# Patient Record
Sex: MALE | Race: WHITE
[De-identification: names, ages, dates, MRNs, and addresses within clinical notes are randomized per-mention and may not be internally consistent; named-entity substitution may affect disease eponyms.]

---

## 2017-08-01 ENCOUNTER — HOSPITAL ENCOUNTER (EMERGENCY)
Dept: HOSPITAL 33 - ED | Age: 22
Discharge: HOME | End: 2017-08-01
Payer: COMMERCIAL

## 2017-08-01 VITALS — OXYGEN SATURATION: 100 % | SYSTOLIC BLOOD PRESSURE: 124 MMHG | DIASTOLIC BLOOD PRESSURE: 70 MMHG | HEART RATE: 73 BPM

## 2017-08-01 DIAGNOSIS — R50.9: ICD-10-CM

## 2017-08-01 DIAGNOSIS — J02.9: ICD-10-CM

## 2017-08-01 DIAGNOSIS — H10.9: Primary | ICD-10-CM

## 2017-08-01 PROCEDURE — 70450 CT HEAD/BRAIN W/O DYE: CPT

## 2017-08-01 PROCEDURE — 99284 EMERGENCY DEPT VISIT MOD MDM: CPT

## 2017-08-01 PROCEDURE — 99283 EMERGENCY DEPT VISIT LOW MDM: CPT
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
## 2017-08-02 NOTE — XRAY
Indication: Pain and dizziness following head injury 2 days ago.



Multiple contiguous axial images obtained through the head without contrast.



Comparison: June 23, 2011.



Again normal appearing brain parenchyma, ventricles, and bony calvarium.

Visualized paranasal sinuses and mastoid air cells are pneumatized and clear.



Impression: Stable normal CT head without contrast exam.



CT DI 51.26

## 2017-08-08 ENCOUNTER — HOSPITAL ENCOUNTER (EMERGENCY)
Dept: HOSPITAL 33 - ED | Age: 22
Discharge: HOME | End: 2017-08-08
Payer: COMMERCIAL

## 2017-08-08 VITALS — HEART RATE: 71 BPM | SYSTOLIC BLOOD PRESSURE: 128 MMHG | OXYGEN SATURATION: 99 % | DIASTOLIC BLOOD PRESSURE: 66 MMHG

## 2017-08-08 DIAGNOSIS — K08.89: Primary | ICD-10-CM

## 2017-08-08 PROCEDURE — 99281 EMR DPT VST MAYX REQ PHY/QHP: CPT

## 2017-08-08 NOTE — ERPHSYRPT
- History of Present Illness


Time Seen by Provider: 08/08/17 17:02


Source: patient


Exam Limitations: no limitations


Patient Subjective Stated Complaint: PT REPORTS PROBLEMS WITH HIS LEFT WISDOM 

TOOTH-STATES THAT HE HAS A DENTIST YESENIA 3 WKS-DENTIST INSTRUCTED PT TO TAKE 

IBUPROFEN-PT STATES HE NEEDS RX FOR PAIN MEDS TO GET HIM THROUGH UNTIL HIS 

DENTIST YESENIA-DENIES FEVER


Triage Nursing Assessment: PT PINK WARM ET DRY-ALERT-NO SWELLING OR REDNESS 

NOTED-RESP EASY ET NONLABORED AT THIS TIME-PT SPEAKING IN COMPLETE SENTECES 

WITH EASE


Physician History: 





The patient is a 22-year-old male with his girlfriend complaining of a problem 

with his left lower wisdom tooth for a few days.  He says pieces of the tooth 

have broken off.  The area is swollen and tender.  He called his dentist but 

the dentist can't see him for 3 weeks.  His past medical history is significant 

for headaches.


Timing/Duration: gradual onset, days


Severity: moderate


ENT Location: dental


Prearrival Treatment: over the counter meds


Modifying Factors: Improves With: nothing


Associated Symptoms: tooth pain


Allergies/Adverse Reactions: 








No Known Drug Allergies Allergy (Verified 08/08/17 17:01)


 





Hx Tetanus, Diphtheria Vaccination/Date Given: Yes


Hx Influenza Vaccination/Date Given: No


Hx Pneumococcal Vaccination/Date Given: No


Immunizations Up to Date: Yes





- Review of Systems


Constitutional: No Fever, No Chills


Eyes: No Symptoms


Ears, Nose, & Throat: Other (dental pain)


Respiratory: No Cough, No Dyspnea


Cardiac: No Chest Pain, No Edema, No Syncope


Abdominal/Gastrointestinal: No Abdominal Pain, No Nausea, No Vomiting, No 

Diarrhea


Genitourinary Symptoms: No Dysuria


Musculoskeletal: No Back Pain, No Neck Pain


Skin: No Rash


Neurological: No Dizziness, No Focal Weakness, No Sensory Changes


Psychological: No Symptoms


Endocrine: No Symptoms


Hematologic/Lymphatic: No Symptoms


Immunological/Allergic: No Symptoms


All Other Systems: Reviewed and Negative





- Past Medical History


Pertinent Past Medical History: No





- Past Surgical History


Past Surgical History: No


Other Surgical History: MYRIGNOTOMY





- Social History


Smoking Status: Never smoker


Exposure to second hand smoke: No


Drug Use: none


Patient Lives Alone: No





- Nursing Vital Signs


Nursing Vital Signs: 





 Initial Vital Signs











Temperature  97.6 F   08/08/17 16:57


 


Pulse Rate  75   08/08/17 16:57


 


Respiratory Rate  20   08/08/17 16:57


 


Blood Pressure  128/73   08/08/17 16:57


 


O2 Sat by Pulse Oximetry  97   08/08/17 16:57








 Pain Scale











Pain Intensity                 9

















- Physical Exam


General Appearance: no apparent distress, alert


Eye Exam: bilateral eye: PERRL, EOMI


Ear Exam: bilateral ear: auricle normal


Nasal Exam: normal inspection


Throat Exam: mandibular swelling (Examination of the oral cavity shows mild to 

moderate swelling of the gums surrounding the left lower wisdom tooth.  The 

area is also slightly erythematous.  The wisdom tooth itself appears to have 

fractured.)


Neck Exam: supple


Cardiovascular/Respiratory Exam: normal breath sounds, regular rate/rhythm


Abdominal Exam: non-tender, soft


Neurologic Exam: alert, oriented x 3, sensation nml, No motor deficits


Skin Exam: normal color, warm, dry


**SpO2 Interpretation**: normal


SpO2: 97


Oxygen Delivery: Room Air





- Progress


Progress: unchanged (The patient was offered a Toradol IM injection.  The 

patient declined.)


Counseled pt/family regarding: diagnosis, need for follow-up





- Departure


Time of Disposition: 17:14


Departure Disposition: Home


Clinical Impression: 


 Pain, dental





Condition: Stable


Critical Care Time: No


Additional Instructions: 


You have tender and swollen gum tissue around the fractured left lower wisdom 

tooth.  Take penicillin  mg 4 times a day for 10 days.  Take Lodine 400 

mg every 8 hours as needed for pain.  Follow-up with your dentist as scheduled.


Prescriptions: 


Etodolac 400 mg [Lodine 400 mg] 400 mg PO Q8H PRN PRN #20 tablet


 PRN Reason: Pain


Penicillin V Potassium 500 mg PO QID #40 tablet

## 2019-03-26 ENCOUNTER — HOSPITAL ENCOUNTER (EMERGENCY)
Dept: HOSPITAL 33 - ED | Age: 24
Discharge: LEFT BEFORE DISCHARGE | End: 2019-03-26
Payer: COMMERCIAL

## 2019-03-26 DIAGNOSIS — Z53.9: Primary | ICD-10-CM
